# Patient Record
Sex: FEMALE | Race: WHITE | NOT HISPANIC OR LATINO
[De-identification: names, ages, dates, MRNs, and addresses within clinical notes are randomized per-mention and may not be internally consistent; named-entity substitution may affect disease eponyms.]

---

## 2020-10-27 ENCOUNTER — APPOINTMENT (OUTPATIENT)
Dept: ENDOCRINOLOGY | Facility: CLINIC | Age: 75
End: 2020-10-27
Payer: MEDICARE

## 2020-10-27 VITALS
SYSTOLIC BLOOD PRESSURE: 130 MMHG | HEART RATE: 91 BPM | WEIGHT: 218 LBS | DIASTOLIC BLOOD PRESSURE: 81 MMHG | HEIGHT: 62.5 IN | BODY MASS INDEX: 39.11 KG/M2

## 2020-10-27 DIAGNOSIS — Z86.39 PERSONAL HISTORY OF OTHER ENDOCRINE, NUTRITIONAL AND METABOLIC DISEASE: ICD-10-CM

## 2020-10-27 DIAGNOSIS — Z78.9 OTHER SPECIFIED HEALTH STATUS: ICD-10-CM

## 2020-10-27 DIAGNOSIS — Z63.4 DISAPPEARANCE AND DEATH OF FAMILY MEMBER: ICD-10-CM

## 2020-10-27 DIAGNOSIS — M79.606 PAIN IN LEG, UNSPECIFIED: ICD-10-CM

## 2020-10-27 LAB
GLUCOSE BLDC GLUCOMTR-MCNC: 154
HBA1C MFR BLD HPLC: 7.8

## 2020-10-27 PROCEDURE — 83036 HEMOGLOBIN GLYCOSYLATED A1C: CPT | Mod: QW

## 2020-10-27 PROCEDURE — 99072 ADDL SUPL MATRL&STAF TM PHE: CPT

## 2020-10-27 PROCEDURE — 82962 GLUCOSE BLOOD TEST: CPT

## 2020-10-27 PROCEDURE — 99204 OFFICE O/P NEW MOD 45 MIN: CPT | Mod: 25

## 2020-10-27 SDOH — SOCIAL STABILITY - SOCIAL INSECURITY: DISSAPEARANCE AND DEATH OF FAMILY MEMBER: Z63.4

## 2020-10-27 NOTE — HISTORY OF PRESENT ILLNESS
[FreeTextEntry1] : 74 y.o. female with a h/o type 2 Dm for over 10 yrs.\par The patient is currently on metformin 850 mg TID and Lantus 36 u BID.\par She has gained about 40 lb during the last several months.\par She was treated with Trulicity in the past but did not tolerate this medications - GI side effects.\par She had a recent eye exam, reportedly without retinopathy.\par She c/o tingling and numbness in her feet.\par She reports no hypoglycemia.\par She recently lost her  to [employer].\par She has home care aid several days a week.\par HbA1C today is 7.8%, glucose - 154 mg/dl.\par

## 2020-10-27 NOTE — REASON FOR VISIT
[Initial Evaluation] : an initial evaluation [DM Type 2] : DM Type 2 [Hypothyroidism] : hypothyroidism [Weight Management/Obesity] : weight management/obesity

## 2020-10-27 NOTE — ASSESSMENT
[FreeTextEntry1] : DM, type 2, subadequate glycemic control; diabetic neuropathy.\par Obesity.\par Hypothyroidism.\par Lab. tests today.\par To see nutritionist.\par Appointment with the .\par Consider using Jardiance - discussed in detail.\par Consider reducing the dose of metformin to 1000 mg BID and Lantus to 40 u HS.\par F/u - 2 weeks.\par

## 2020-10-28 ENCOUNTER — APPOINTMENT (OUTPATIENT)
Dept: ENDOCRINOLOGY | Facility: CLINIC | Age: 75
End: 2020-10-28
Payer: MEDICARE

## 2020-10-28 PROCEDURE — 99072 ADDL SUPL MATRL&STAF TM PHE: CPT

## 2020-10-28 PROCEDURE — 97802 MEDICAL NUTRITION INDIV IN: CPT

## 2020-11-03 ENCOUNTER — APPOINTMENT (OUTPATIENT)
Dept: ENDOCRINOLOGY | Facility: CLINIC | Age: 75
End: 2020-11-03

## 2020-11-23 ENCOUNTER — APPOINTMENT (OUTPATIENT)
Dept: ENDOCRINOLOGY | Facility: CLINIC | Age: 75
End: 2020-11-23
Payer: MEDICARE

## 2020-11-23 VITALS — WEIGHT: 214 LBS | BODY MASS INDEX: 38.52 KG/M2

## 2020-11-23 PROCEDURE — 97803 MED NUTRITION INDIV SUBSEQ: CPT

## 2021-01-19 ENCOUNTER — APPOINTMENT (OUTPATIENT)
Dept: ENDOCRINOLOGY | Facility: CLINIC | Age: 76
End: 2021-01-19
Payer: MEDICARE

## 2021-01-19 VITALS
HEART RATE: 79 BPM | WEIGHT: 206 LBS | HEIGHT: 62.5 IN | BODY MASS INDEX: 36.96 KG/M2 | DIASTOLIC BLOOD PRESSURE: 70 MMHG | SYSTOLIC BLOOD PRESSURE: 123 MMHG

## 2021-01-19 VITALS — WEIGHT: 206 LBS | BODY MASS INDEX: 37.08 KG/M2

## 2021-01-19 DIAGNOSIS — E11.3553 TYPE 2 DIABETES MELLITUS WITH STABLE PROLIFERATIVE DIABETIC RETINOPATHY, BILATERAL: ICD-10-CM

## 2021-01-19 DIAGNOSIS — F32.9 MAJOR DEPRESSIVE DISORDER, SINGLE EPISODE, UNSPECIFIED: ICD-10-CM

## 2021-01-19 DIAGNOSIS — E03.9 HYPOTHYROIDISM, UNSPECIFIED: ICD-10-CM

## 2021-01-19 DIAGNOSIS — E66.9 OBESITY, UNSPECIFIED: ICD-10-CM

## 2021-01-19 LAB
GLUCOSE BLDC GLUCOMTR-MCNC: 146
HBA1C MFR BLD HPLC: 7.5

## 2021-01-19 PROCEDURE — 99072 ADDL SUPL MATRL&STAF TM PHE: CPT

## 2021-01-19 PROCEDURE — 99214 OFFICE O/P EST MOD 30 MIN: CPT | Mod: 25

## 2021-01-19 PROCEDURE — 82962 GLUCOSE BLOOD TEST: CPT

## 2021-01-19 PROCEDURE — 97803 MED NUTRITION INDIV SUBSEQ: CPT

## 2021-01-19 PROCEDURE — 83036 HEMOGLOBIN GLYCOSYLATED A1C: CPT | Mod: QW

## 2021-01-20 LAB
ALBUMIN SERPL ELPH-MCNC: 4.5 G/DL
ALP BLD-CCNC: 74 U/L
ALT SERPL-CCNC: 14 U/L
ANION GAP SERPL CALC-SCNC: 16 MMOL/L
AST SERPL-CCNC: 15 U/L
BASOPHILS # BLD AUTO: 0.02 K/UL
BASOPHILS NFR BLD AUTO: 0.4 %
BILIRUB SERPL-MCNC: 0.4 MG/DL
BUN SERPL-MCNC: 13 MG/DL
CALCIUM SERPL-MCNC: 9.7 MG/DL
CHLORIDE SERPL-SCNC: 105 MMOL/L
CHOLEST SERPL-MCNC: 194 MG/DL
CO2 SERPL-SCNC: 24 MMOL/L
CREAT SERPL-MCNC: 0.71 MG/DL
EOSINOPHIL # BLD AUTO: 0.03 K/UL
EOSINOPHIL NFR BLD AUTO: 0.7 %
GLUCOSE SERPL-MCNC: 142 MG/DL
HCT VFR BLD CALC: 41.6 %
HDLC SERPL-MCNC: 41 MG/DL
HGB BLD-MCNC: 12.7 G/DL
IMM GRANULOCYTES NFR BLD AUTO: 0 %
LDLC SERPL CALC-MCNC: 118 MG/DL
LYMPHOCYTES # BLD AUTO: 2.62 K/UL
LYMPHOCYTES NFR BLD AUTO: 57.8 %
MAN DIFF?: NORMAL
MCHC RBC-ENTMCNC: 30.1 PG
MCHC RBC-ENTMCNC: 30.5 GM/DL
MCV RBC AUTO: 98.6 FL
MONOCYTES # BLD AUTO: 0.29 K/UL
MONOCYTES NFR BLD AUTO: 6.4 %
NEUTROPHILS # BLD AUTO: 1.57 K/UL
NEUTROPHILS NFR BLD AUTO: 34.7 %
NONHDLC SERPL-MCNC: 153 MG/DL
PLATELET # BLD AUTO: 269 K/UL
POTASSIUM SERPL-SCNC: 4.3 MMOL/L
PROT SERPL-MCNC: 6.9 G/DL
RBC # BLD: 4.22 M/UL
RBC # FLD: 13.4 %
SODIUM SERPL-SCNC: 145 MMOL/L
T3FREE SERPL-MCNC: 2.33 PG/ML
T4 FREE SERPL-MCNC: 1.4 NG/DL
TRIGL SERPL-MCNC: 174 MG/DL
TSH SERPL-ACNC: 1.52 UIU/ML
WBC # FLD AUTO: 4.53 K/UL

## 2021-01-20 NOTE — ASSESSMENT
[FreeTextEntry1] : DM, type 2, adequate glycemic control - would continue current tx.\par hypothyroidism.\par lab. tests today.\par Medications refilled.\par F/u - 3 months.

## 2021-01-20 NOTE — HISTORY OF PRESENT ILLNESS
[FreeTextEntry1] : 74 y.o. female with a h/o type 2 Dm for over 10 yrs.\par The patient is currently on metformin 850 mg TID and Lantus 36 u BID.\par She has gained about 40 lb during the last several months.\par She was treated with Trulicity in the past but did not tolerate this medications - GI side effects.\par She had a recent eye exam, reportedly without retinopathy.\par She c/o tingling and numbness in her feet.\par She reports no hypoglycemia.\par She recently lost her  to COVID19.\par She has home care aid several days a week.\par HbA1C today is 7.8%, glucose - 154 mg/dl.\par 1/19/2021. The patient is doing well.\par She has lost 12 lb.\par Tolerating Jardiance without difficulty.\par HbA1C today is 7.5%, glucose - 146 mg/dl.\par She continues on T4 tx.\par

## 2021-01-20 NOTE — PHYSICAL EXAM
[Alert] : alert [Well Nourished] : well nourished [No Acute Distress] : no acute distress [Well Developed] : well developed [Normal Sclera/Conjunctiva] : normal sclera/conjunctiva [EOMI] : extra ocular movement intact [No Proptosis] : no proptosis [Normal Oropharynx] : the oropharynx was normal [Thyroid Not Enlarged] : the thyroid was not enlarged [No Thyroid Nodules] : no palpable thyroid nodules [No Respiratory Distress] : no respiratory distress [No Accessory Muscle Use] : no accessory muscle use [Clear to Auscultation] : lungs were clear to auscultation bilaterally [Normal S1, S2] : normal S1 and S2 [Normal Rate] : heart rate was normal [Regular Rhythm] : with a regular rhythm [No Edema] : no peripheral edema [Pedal Pulses Normal] : the pedal pulses are present [Normal Bowel Sounds] : normal bowel sounds [Not Tender] : non-tender [Not Distended] : not distended [Soft] : abdomen soft [Normal Anterior Cervical Nodes] : no anterior cervical lymphadenopathy [Normal Posterior Cervical Nodes] : no posterior cervical lymphadenopathy [No Spinal Tenderness] : no spinal tenderness [Spine Straight] : spine straight [Normal Gait] : normal gait [No Stigmata of Cushings Syndrome] : no stigmata of Cushings Syndrome [Normal Strength/Tone] : muscle strength and tone were normal [No Rash] : no rash [Normal Reflexes] : deep tendon reflexes were 2+ and symmetric [No Tremors] : no tremors [Oriented x3] : oriented to person, place, and time [Acanthosis Nigricans] : no acanthosis nigricans

## 2021-01-22 ENCOUNTER — APPOINTMENT (OUTPATIENT)
Dept: ENDOCRINOLOGY | Facility: CLINIC | Age: 76
End: 2021-01-22

## 2021-01-25 ENCOUNTER — APPOINTMENT (OUTPATIENT)
Dept: ENDOCRINOLOGY | Facility: CLINIC | Age: 76
End: 2021-01-25

## 2021-02-01 ENCOUNTER — APPOINTMENT (OUTPATIENT)
Dept: ENDOCRINOLOGY | Facility: CLINIC | Age: 76
End: 2021-02-01

## 2021-02-08 ENCOUNTER — APPOINTMENT (OUTPATIENT)
Dept: ENDOCRINOLOGY | Facility: CLINIC | Age: 76
End: 2021-02-08

## 2021-02-17 ENCOUNTER — APPOINTMENT (OUTPATIENT)
Dept: ENDOCRINOLOGY | Facility: CLINIC | Age: 76
End: 2021-02-17
Payer: MEDICARE

## 2021-02-17 PROCEDURE — 98968 PH1 ASSMT&MGMT NQHP 21-30: CPT

## 2024-01-23 ENCOUNTER — INPATIENT (INPATIENT)
Facility: HOSPITAL | Age: 79
LOS: 0 days | Discharge: HOME | DRG: 177 | End: 2024-01-24
Attending: STUDENT IN AN ORGANIZED HEALTH CARE EDUCATION/TRAINING PROGRAM | Admitting: STUDENT IN AN ORGANIZED HEALTH CARE EDUCATION/TRAINING PROGRAM
Payer: COMMERCIAL

## 2024-01-23 VITALS
RESPIRATION RATE: 16 BRPM | SYSTOLIC BLOOD PRESSURE: 115 MMHG | HEART RATE: 92 BPM | DIASTOLIC BLOOD PRESSURE: 56 MMHG | TEMPERATURE: 101 F | OXYGEN SATURATION: 92 %

## 2024-01-23 DIAGNOSIS — K57.92 DIVERTICULITIS OF INTESTINE, PART UNSPECIFIED, WITHOUT PERFORATION OR ABSCESS WITHOUT BLEEDING: ICD-10-CM

## 2024-01-23 DIAGNOSIS — F32.9 MAJOR DEPRESSIVE DISORDER, SINGLE EPISODE, UNSPECIFIED: ICD-10-CM

## 2024-01-23 DIAGNOSIS — K21.9 GASTRO-ESOPHAGEAL REFLUX DISEASE WITHOUT ESOPHAGITIS: ICD-10-CM

## 2024-01-23 DIAGNOSIS — K59.00 CONSTIPATION, UNSPECIFIED: ICD-10-CM

## 2024-01-23 DIAGNOSIS — J96.01 ACUTE RESPIRATORY FAILURE WITH HYPOXIA: ICD-10-CM

## 2024-01-23 DIAGNOSIS — E11.9 TYPE 2 DIABETES MELLITUS WITHOUT COMPLICATIONS: ICD-10-CM

## 2024-01-23 DIAGNOSIS — E78.5 HYPERLIPIDEMIA, UNSPECIFIED: ICD-10-CM

## 2024-01-23 DIAGNOSIS — G47.00 INSOMNIA, UNSPECIFIED: ICD-10-CM

## 2024-01-23 DIAGNOSIS — E03.9 HYPOTHYROIDISM, UNSPECIFIED: ICD-10-CM

## 2024-01-23 DIAGNOSIS — Z29.9 ENCOUNTER FOR PROPHYLACTIC MEASURES, UNSPECIFIED: ICD-10-CM

## 2024-01-23 LAB
ALBUMIN SERPL ELPH-MCNC: 3.6 G/DL — SIGNIFICANT CHANGE UP (ref 3.3–5)
ALP SERPL-CCNC: 87 U/L — SIGNIFICANT CHANGE UP (ref 40–120)
ALT FLD-CCNC: 14 U/L — SIGNIFICANT CHANGE UP (ref 10–45)
ANION GAP SERPL CALC-SCNC: 10 MMOL/L — SIGNIFICANT CHANGE UP (ref 5–17)
AST SERPL-CCNC: 21 U/L — SIGNIFICANT CHANGE UP (ref 10–40)
BASOPHILS # BLD AUTO: 0.02 K/UL — SIGNIFICANT CHANGE UP (ref 0–0.2)
BASOPHILS NFR BLD AUTO: 0.5 % — SIGNIFICANT CHANGE UP (ref 0–2)
BILIRUB SERPL-MCNC: 0.7 MG/DL — SIGNIFICANT CHANGE UP (ref 0.2–1.2)
BUN SERPL-MCNC: 9 MG/DL — SIGNIFICANT CHANGE UP (ref 7–23)
CALCIUM SERPL-MCNC: 8.9 MG/DL — SIGNIFICANT CHANGE UP (ref 8.4–10.5)
CHLORIDE SERPL-SCNC: 107 MMOL/L — SIGNIFICANT CHANGE UP (ref 96–108)
CO2 SERPL-SCNC: 25 MMOL/L — SIGNIFICANT CHANGE UP (ref 22–31)
CREAT SERPL-MCNC: 0.73 MG/DL — SIGNIFICANT CHANGE UP (ref 0.5–1.3)
EGFR: 84 ML/MIN/1.73M2 — SIGNIFICANT CHANGE UP
EOSINOPHIL # BLD AUTO: 0.02 K/UL — SIGNIFICANT CHANGE UP (ref 0–0.5)
EOSINOPHIL NFR BLD AUTO: 0.5 % — SIGNIFICANT CHANGE UP (ref 0–6)
GLUCOSE BLDC GLUCOMTR-MCNC: 191 MG/DL — HIGH (ref 70–99)
GLUCOSE SERPL-MCNC: 151 MG/DL — HIGH (ref 70–99)
HCT VFR BLD CALC: 38.1 % — SIGNIFICANT CHANGE UP (ref 34.5–45)
HGB BLD-MCNC: 12.3 G/DL — SIGNIFICANT CHANGE UP (ref 11.5–15.5)
IMM GRANULOCYTES NFR BLD AUTO: 0.3 % — SIGNIFICANT CHANGE UP (ref 0–0.9)
LYMPHOCYTES # BLD AUTO: 1.75 K/UL — SIGNIFICANT CHANGE UP (ref 1–3.3)
LYMPHOCYTES # BLD AUTO: 44.3 % — HIGH (ref 13–44)
MCHC RBC-ENTMCNC: 29.8 PG — SIGNIFICANT CHANGE UP (ref 27–34)
MCHC RBC-ENTMCNC: 32.3 GM/DL — SIGNIFICANT CHANGE UP (ref 32–36)
MCV RBC AUTO: 92.3 FL — SIGNIFICANT CHANGE UP (ref 80–100)
MONOCYTES # BLD AUTO: 0.51 K/UL — SIGNIFICANT CHANGE UP (ref 0–0.9)
MONOCYTES NFR BLD AUTO: 12.9 % — SIGNIFICANT CHANGE UP (ref 2–14)
NEUTROPHILS # BLD AUTO: 1.64 K/UL — LOW (ref 1.8–7.4)
NEUTROPHILS NFR BLD AUTO: 41.5 % — LOW (ref 43–77)
NRBC # BLD: 0 /100 WBCS — SIGNIFICANT CHANGE UP (ref 0–0)
PLATELET # BLD AUTO: 188 K/UL — SIGNIFICANT CHANGE UP (ref 150–400)
POTASSIUM SERPL-MCNC: 3.8 MMOL/L — SIGNIFICANT CHANGE UP (ref 3.5–5.3)
POTASSIUM SERPL-SCNC: 3.8 MMOL/L — SIGNIFICANT CHANGE UP (ref 3.5–5.3)
PROT SERPL-MCNC: 6.4 G/DL — SIGNIFICANT CHANGE UP (ref 6–8.3)
RBC # BLD: 4.13 M/UL — SIGNIFICANT CHANGE UP (ref 3.8–5.2)
RBC # FLD: 14.2 % — SIGNIFICANT CHANGE UP (ref 10.3–14.5)
SARS-COV-2 RNA SPEC QL NAA+PROBE: POSITIVE
SODIUM SERPL-SCNC: 142 MMOL/L — SIGNIFICANT CHANGE UP (ref 135–145)
WBC # BLD: 3.95 K/UL — SIGNIFICANT CHANGE UP (ref 3.8–10.5)
WBC # FLD AUTO: 3.95 K/UL — SIGNIFICANT CHANGE UP (ref 3.8–10.5)

## 2024-01-23 PROCEDURE — 99285 EMERGENCY DEPT VISIT HI MDM: CPT | Mod: FS

## 2024-01-23 PROCEDURE — 71046 X-RAY EXAM CHEST 2 VIEWS: CPT | Mod: 26

## 2024-01-23 RX ORDER — LEMBOREXANT 10 MG/1
1 TABLET, FILM COATED ORAL
Refills: 0 | DISCHARGE

## 2024-01-23 RX ORDER — LEVOTHYROXINE SODIUM 125 MCG
112 TABLET ORAL DAILY
Refills: 0 | Status: DISCONTINUED | OUTPATIENT
Start: 2024-01-23 | End: 2024-01-24

## 2024-01-23 RX ORDER — DARIDOREXANT 25 MG/1
1 TABLET, FILM COATED ORAL
Refills: 0 | DISCHARGE

## 2024-01-23 RX ORDER — FAMOTIDINE 10 MG/ML
20 INJECTION INTRAVENOUS DAILY
Refills: 0 | Status: DISCONTINUED | OUTPATIENT
Start: 2024-01-23 | End: 2024-01-24

## 2024-01-23 RX ORDER — ATORVASTATIN CALCIUM 80 MG/1
40 TABLET, FILM COATED ORAL AT BEDTIME
Refills: 0 | Status: DISCONTINUED | OUTPATIENT
Start: 2024-01-23 | End: 2024-01-24

## 2024-01-23 RX ORDER — METFORMIN HYDROCHLORIDE 850 MG/1
1 TABLET ORAL
Refills: 0 | DISCHARGE

## 2024-01-23 RX ORDER — ACETAMINOPHEN 500 MG
975 TABLET ORAL ONCE
Refills: 0 | Status: COMPLETED | OUTPATIENT
Start: 2024-01-23 | End: 2024-01-23

## 2024-01-23 RX ORDER — ROSUVASTATIN CALCIUM 5 MG/1
1 TABLET ORAL
Refills: 0 | DISCHARGE

## 2024-01-23 RX ORDER — TRAZODONE HCL 50 MG
50 TABLET ORAL AT BEDTIME
Refills: 0 | Status: DISCONTINUED | OUTPATIENT
Start: 2024-01-23 | End: 2024-01-24

## 2024-01-23 RX ORDER — INSULIN GLARGINE 100 [IU]/ML
16 INJECTION, SOLUTION SUBCUTANEOUS EVERY MORNING
Refills: 0 | Status: DISCONTINUED | OUTPATIENT
Start: 2024-01-24 | End: 2024-01-24

## 2024-01-23 RX ORDER — ENOXAPARIN SODIUM 100 MG/ML
40 INJECTION SUBCUTANEOUS EVERY 24 HOURS
Refills: 0 | Status: DISCONTINUED | OUTPATIENT
Start: 2024-01-23 | End: 2024-01-24

## 2024-01-23 RX ORDER — PANTOPRAZOLE SODIUM 20 MG/1
40 TABLET, DELAYED RELEASE ORAL
Refills: 0 | Status: DISCONTINUED | OUTPATIENT
Start: 2024-01-23 | End: 2024-01-24

## 2024-01-23 RX ORDER — POLYETHYLENE GLYCOL 3350 17 G/17G
17 POWDER, FOR SOLUTION ORAL EVERY 12 HOURS
Refills: 0 | Status: DISCONTINUED | OUTPATIENT
Start: 2024-01-23 | End: 2024-01-24

## 2024-01-23 RX ORDER — ONDANSETRON 8 MG/1
4 TABLET, FILM COATED ORAL EVERY 8 HOURS
Refills: 0 | Status: DISCONTINUED | OUTPATIENT
Start: 2024-01-23 | End: 2024-01-23

## 2024-01-23 RX ORDER — BREXPIPRAZOLE 0.25 MG/1
1 TABLET ORAL
Refills: 0 | DISCHARGE

## 2024-01-23 RX ORDER — FAMOTIDINE 10 MG/ML
1 INJECTION INTRAVENOUS
Refills: 0 | DISCHARGE

## 2024-01-23 RX ORDER — EMPAGLIFLOZIN 10 MG/1
1 TABLET, FILM COATED ORAL
Refills: 0 | DISCHARGE

## 2024-01-23 RX ORDER — ACETAMINOPHEN 500 MG
650 TABLET ORAL EVERY 6 HOURS
Refills: 0 | Status: DISCONTINUED | OUTPATIENT
Start: 2024-01-23 | End: 2024-01-24

## 2024-01-23 RX ORDER — REMDESIVIR 5 MG/ML
200 INJECTION INTRAVENOUS EVERY 24 HOURS
Refills: 0 | Status: COMPLETED | OUTPATIENT
Start: 2024-01-23 | End: 2024-01-23

## 2024-01-23 RX ORDER — LAMOTRIGINE 25 MG/1
0 TABLET, ORALLY DISINTEGRATING ORAL
Refills: 0 | DISCHARGE

## 2024-01-23 RX ORDER — REMDESIVIR 5 MG/ML
100 INJECTION INTRAVENOUS EVERY 24 HOURS
Refills: 0 | Status: DISCONTINUED | OUTPATIENT
Start: 2024-01-24 | End: 2024-01-24

## 2024-01-23 RX ORDER — LANOLIN ALCOHOL/MO/W.PET/CERES
3 CREAM (GRAM) TOPICAL AT BEDTIME
Refills: 0 | Status: DISCONTINUED | OUTPATIENT
Start: 2024-01-23 | End: 2024-01-23

## 2024-01-23 RX ORDER — CLONAZEPAM 1 MG
0 TABLET ORAL
Refills: 0 | DISCHARGE

## 2024-01-23 RX ORDER — SENNA PLUS 8.6 MG/1
2 TABLET ORAL AT BEDTIME
Refills: 0 | Status: DISCONTINUED | OUTPATIENT
Start: 2024-01-23 | End: 2024-01-24

## 2024-01-23 RX ORDER — REMDESIVIR 5 MG/ML
INJECTION INTRAVENOUS
Refills: 0 | Status: DISCONTINUED | OUTPATIENT
Start: 2024-01-23 | End: 2024-01-24

## 2024-01-23 RX ORDER — INSULIN LISPRO 100/ML
VIAL (ML) SUBCUTANEOUS
Refills: 0 | Status: DISCONTINUED | OUTPATIENT
Start: 2024-01-23 | End: 2024-01-24

## 2024-01-23 RX ORDER — DEXAMETHASONE 0.5 MG/5ML
6 ELIXIR ORAL ONCE
Refills: 0 | Status: COMPLETED | OUTPATIENT
Start: 2024-01-23 | End: 2024-01-23

## 2024-01-23 RX ORDER — LEVOTHYROXINE SODIUM 125 MCG
1 TABLET ORAL
Refills: 0 | DISCHARGE

## 2024-01-23 RX ORDER — DEXAMETHASONE 0.5 MG/5ML
6 ELIXIR ORAL DAILY
Refills: 0 | Status: DISCONTINUED | OUTPATIENT
Start: 2024-01-24 | End: 2024-01-24

## 2024-01-23 RX ADMIN — REMDESIVIR 200 MILLIGRAM(S): 5 INJECTION INTRAVENOUS at 23:36

## 2024-01-23 RX ADMIN — Medication 6 MILLIGRAM(S): at 18:56

## 2024-01-23 RX ADMIN — Medication 50 MILLIGRAM(S): at 22:28

## 2024-01-23 RX ADMIN — Medication 2: at 22:29

## 2024-01-23 RX ADMIN — ATORVASTATIN CALCIUM 40 MILLIGRAM(S): 80 TABLET, FILM COATED ORAL at 22:28

## 2024-01-23 RX ADMIN — ENOXAPARIN SODIUM 40 MILLIGRAM(S): 100 INJECTION SUBCUTANEOUS at 23:26

## 2024-01-23 RX ADMIN — Medication 975 MILLIGRAM(S): at 17:39

## 2024-01-23 RX ADMIN — SENNA PLUS 2 TABLET(S): 8.6 TABLET ORAL at 22:28

## 2024-01-23 NOTE — H&P ADULT - PROBLEM SELECTOR PLAN 2
patient states she has not had a BM in 2-3 days  Denies any abdominal pain or discomfort  abdomen soft NTND, BS present in all 4 quadrants on PE.    Plan:  Start bowel regimen- senna and Miralax  monitor BM  if worsens clinically, consider imaging

## 2024-01-23 NOTE — ED PROVIDER NOTE - OBJECTIVE STATEMENT
77 y/o female w/ hx diverticulitis, natalio, dm, hypothyroidism, gerd p/w covid infx.  States received pneumococcal vaccine 6 days ago, then next day or so began feeling sick with congestion and mildly productive cough.  Saw her PMD, who dx pt with covid 19, advised pt to go to ED.  +fever.  Denies cp, sob, abd pain, n/v/d.  Denies hx smoking or lung problems.

## 2024-01-23 NOTE — H&P ADULT - NSHPSOCIALHISTORY_GEN_ALL_CORE
Lives alone.  passed away 4 years ago.  Active can walk 10 blocks without assistance. Some difficulty with stairs avoids taking them.   Has HHA from 7 am- 1pm daily  Works in fundraising and helping people.  Denies tobacco, alcohol, and recreational drug use.

## 2024-01-23 NOTE — H&P ADULT - PROBLEM SELECTOR PLAN 5
Home med: synthroid 112mcg at home  Plan:   Continue synthroid 112 mcg   Follow up TSH/ T4 home meds based on surescripts: Trazadone 50mg daily, DAYVIGO 5mg daily, quviviq 25  Please continue after official med rec in AM

## 2024-01-23 NOTE — ED ADULT NURSE REASSESSMENT NOTE - NS ED NURSE REASSESS COMMENT FT1
Assumed care of pt, awaiting transfer to IP bed. Denies any complaints at this time, verbalizing readiness to be transported upstairs.

## 2024-01-23 NOTE — ED PROVIDER NOTE - CLINICAL SUMMARY MEDICAL DECISION MAKING FREE TEXT BOX
79 y/o female w/ hx diverticulitis, natalio, dm, hypothyroidism, gerd p/w covid infx.  States received pneumococcal vaccine 6 days ago, then next day or so began feeling sick with congestion and mildly productive cough.  Saw her PMD, who dx pt with covid 19, advised pt to go to ED.  +fever.  Denies cp, sob, abd pain, n/v/d.  Denies hx smoking or lung problems.  VS with T 100.7, O2 sat 92%RA, 90% at bedside   Exam grossly unrevealing  CXR neg  Will admit for covid hypoxia

## 2024-01-23 NOTE — H&P ADULT - HISTORY OF PRESENT ILLNESS
PCP: Dr. Anne  Pharmacy: [facility]   - - - - -    HPI: 79 y/o female w/ hx diverticulitis, natalio, dm, hypothyroidism, gerd p/w covid infx.  States received pneumococcal vaccine 6 days ago, then next day or so began feeling sick with congestion and mildly productive cough.  Saw her PMD, who dx pt with covid 19, advised pt to go to ED.  +fever.  Denies cp, sob, abd pain, n/v/d.  Denies hx smoking or lung problems.    In the ED:  Initial vital signs: T: 100.8 F, HR: 92 BP: 115/56 R:16 SpO2: 92% on RA  ED course:   Labs: significant for no leukocytosis, H/H 12.3/ 38.1, CMP unremarkable, COVID positive   Imaging:  CXR: No acute pulmonary, osseous or soft tissue pathology.  EKG: NSR , left anterior fascicular block  Medications: PO Tylenol 975mg X1, IV dexamethasone 6mg x1,   Consults: none        PCP: Dr. Anne  Pharmacy: [facility]   - - - - -    HPI: 77 y/o female with diverticulitis, natalio, dm, hypothyroidism, gerd presents with sob and cough since  last Wednesday. States she initially had a cough productive of yellow mucous, but today her cough is more dry. States received pneumococcal vaccine 6 days ago, then next day or so began feeling sick with congestion and mildly productive cough.  Patient saw her PCP today who told her she was COVID+. She states she is intermittently febrile. She is constipated, her last BM was 2-3 days ago. She normally has BM daily. Denies cp, sob, abd pain, n/v/d.  Patient states she lives alone. No recent travel. No known sick contacts. Denies hx smoking or lung problems. Patient states she feels a lot better since being in the ED. No additional fevers in the ED. Cough and congestion improved in the ED.    In the ED:  Initial vital signs: T: 100.8 F, HR: 92 BP: 115/56 R:16 SpO2: 92% on RA-> 95 on 5LNC   ED course:   Labs: significant for no leukocytosis, H/H 12.3/ 38.1, CMP unremarkable, COVID positive   Imaging:  CXR: No acute pulmonary, osseous or soft tissue pathology.  EKG: NSR , left anterior fascicular block  Medications: PO Tylenol 975mg X1, IV dexamethasone 6mg x1,   Consults: none        PCP: Dr. Anne  Pharmacy: [facility]   - - - - -    HPI: 77 y/o female with diverticulitis, natalio, dm, hypothyroidism, gerd presents with sob and cough since  last Wednesday. States she initially had a cough productive of yellow mucous, but today her cough is more dry. States received pneumococcal vaccine 6 days ago, then next day or so began feeling sick with congestion and mildly productive cough.  Patient saw her PCP today who told her she was COVID+. She states she is intermittently febrile. She is constipated, her last BM was 2-3 days ago. She normally has BM daily. Denies cp, sob, abd pain, n/v/d.  Patient states she lives alone. No recent travel. No known sick contacts. Denies hx smoking or lung problems. Patient states she feels a lot better since being in the ED. No additional fevers in the ED. Cough and congestion improved in the ED. Patient states she is tired and would like to talk in the AM. She is unsure of her home meds, but states she took them this AM.     In the ED:  Initial vital signs: T: 100.8 F, HR: 92 BP: 115/56 R:16 SpO2: 92% on RA-> 95 on 5LNC   ED course:   Labs: significant for no leukocytosis, H/H 12.3/ 38.1, CMP unremarkable, COVID positive   Imaging:  CXR: No acute pulmonary, osseous or soft tissue pathology.  EKG: NSR , left anterior fascicular block  Medications: PO Tylenol 975mg X1, IV dexamethasone 6mg x1,   Consults: none        PCP: Dr. Anne  Pharmacy: [facility]   - - - - -    HPI: 79 y/o female with diverticulitis, natalio, dm, hypothyroidism, gerd presents with sob and cough  that started  last Wednesday. States she initially had a cough productive of yellow mucous, but today her cough is more dry. States received pneumococcal vaccine 6 days ago, then next day or so began feeling sick with congestion and mildly productive cough.  Patient saw her PCP today who told her she was COVID+. She states she is intermittently febrile at home. She is constipated, her last BM was 2-3 days ago. She normally has BM daily. Denies cp, palpitations, abd pain, n/v/d.  Patient states she lives alone. No recent travel. No known sick contacts. Denies hx smoking or lung problems. Patient states she feels a lot better since being in the ED. No additional fevers in the ED. Cough and congestion improved in the ED. Patient states she is tired and would like to talk in the AM. She is unsure of her home meds, but states she took them this AM.     In the ED:  Initial vital signs: T: 100.8 F, HR: 92 BP: 115/56 R:16 SpO2: 92% on RA-> 95 on 5LNC   ED course:   Labs: significant for no leukocytosis, H/H 12.3/ 38.1, CMP unremarkable, COVID positive   Imaging:  CXR: No acute pulmonary, osseous or soft tissue pathology.  EKG: NSR , left anterior fascicular block  Medications: PO Tylenol 975mg X1, IV dexamethasone 6mg x1,   Consults: none

## 2024-01-23 NOTE — H&P ADULT - NSHPPHYSICALEXAM_GEN_ALL_CORE
.  VITAL SIGNS:  T(C): 37.2 (01-23-24 @ 19:38), Max: 38.2 (01-23-24 @ 16:36)  T(F): 99 (01-23-24 @ 19:38), Max: 100.8 (01-23-24 @ 16:36)  HR: 92 (01-23-24 @ 19:38) (92 - 92)  BP: 126/78 (01-23-24 @ 19:38) (115/56 - 126/78)  BP(mean): --  RR: 18 (01-23-24 @ 19:38) (16 - 18)  SpO2: 95% (01-23-24 @ 19:38) (92% - 95%)  Wt(kg): --    PHYSICAL EXAM:    Constitutional: large body habitus, resting comfortably in bed; NAD  Eyes: PERRL, EOMI, anicteric sclera  ENT: no nasal discharge; uvula midline, no oropharyngeal erythema or exudates; dry MM  Respiratory: CTA B/L; no W/R/R, no retractions  Cardiac: +S1/S2; RRR; no M/R/G; PMI non-displaced  Gastrointestinal: abdomen soft, NT/ND; no rebound or guarding; +BSx4  Extremities: WWP, no clubbing or cyanosis; no peripheral edema  Musculoskeletal: NROM x4; no joint swelling, tenderness or erythema  Vascular: 2+ radial, DP/PT pulses B/L  Dermatologic: skin warm, dry and intact; no rashes, wounds, or scars  Neurologic: AAOx3; CNII-XII grossly intact; no focal deficits  Psychiatric: affect and characteristics of appearance, verbalizations, behaviors are appropriate

## 2024-01-23 NOTE — ED PROVIDER NOTE - NSICDXPASTMEDICALHX_GEN_ALL_CORE_FT
PAST MEDICAL HISTORY:  Diabetes     Diverticulitis     GERD (gastroesophageal reflux disease)     Hypothyroid     MARY ANN (obstructive sleep apnea)

## 2024-01-23 NOTE — H&P ADULT - PROBLEM SELECTOR PLAN 6
on home dexilant DR 60mg and famotidine 20mg     Plan:  continue home meds home med: Rosuvastatin 10mg     Plan:  Continue home med Rosuvastatin 10mg   Lipid panel Home med: synthroid 112mcg at home  Plan:   Continue synthroid 112 mcg   Follow up TSH/ T4

## 2024-01-23 NOTE — H&P ADULT - PROBLEM SELECTOR PLAN 1
presented with sob and cough for about 1 week, found to be COVID+  requiring 2L NC  sating 92% on RA at admission, improved to 95% on 2L NC    Plan:  start dexamethasone 6 mG daily for 5 days. Will start insulin sliding scale while on steroids   start remdesavir 200 mG IV x 1 dose on day 1, followed by 100 mG IV q24h for 4 days  wean oxygen as tolerated   encourage hydration  symptomatic treatment- mucinex for congestion and tessalon pearles for cough  OOBTC  ICS

## 2024-01-23 NOTE — H&P ADULT - PROBLEM SELECTOR PLAN 3
home meds: metformin 850mg BID, Jardiance 25mg daily, lantus 16 units in AM    Plan:  insuline sliding scale  continue home insulin  patient needs official med rec in AM. Patient unsure about exact medications home meds: metformin 850mg BID, Jardiance 25mg daily, lantus 16 units in AM    Plan:  insuline sliding scale  continue home insulin  expecting increased insulin requirements as patient is on Steroids  continue to monitor fsg  patient needs official med rec in AM. Patient unsure about exact medications

## 2024-01-23 NOTE — H&P ADULT - PROBLEM SELECTOR PLAN 4
home meds based on surescripts: vilazodone 40 daily, rexulti 2mg daily and vortioxetine 20daily    Plan:  patient needs official med rec in AM. Patient unsure about exact medications  Please continue after official med rec in AM    Anxiety: Klonapin 2mg as needed per surescripts  Please continue after official med rec in AM

## 2024-01-23 NOTE — H&P ADULT - NSHPLABSRESULTS_GEN_ALL_CORE
LABS:                         12.3   3.95  )-----------( 188      ( 23 Jan 2024 18:08 )             38.1     01-23    142  |  107  |  9   ----------------------------<  151<H>  3.8   |  25  |  0.73    Ca    8.9      23 Jan 2024 18:08    TPro  6.4  /  Alb  3.6  /  TBili  0.7  /  DBili  x   /  AST  21  /  ALT  14  /  AlkPhos  87  01-23      Urinalysis Basic - ( 23 Jan 2024 18:08 )    Color: x / Appearance: x / SG: x / pH: x  Gluc: 151 mg/dL / Ketone: x  / Bili: x / Urobili: x   Blood: x / Protein: x / Nitrite: x   Leuk Esterase: x / RBC: x / WBC x   Sq Epi: x / Non Sq Epi: x / Bacteria: x                RADIOLOGY, EKG & ADDITIONAL TESTS:

## 2024-01-23 NOTE — ED PROVIDER NOTE - NS ED ROS FT
CONSTITUTIONAL: Denies fever and chills    HEENT: +cough    RESPIRATORY: Denies SOB    CARDIOVASCULAR: Denies chest pain.    GASTROINTESTINAL: Denies abdominal pain, nausea, vomiting and diarrhea.

## 2024-01-23 NOTE — H&P ADULT - PROBLEM SELECTOR PLAN 9
F: none  E: keep K >4 and Mg >2, replete as necessary  N: DASH diet  DVT ppx: heparin sub q  Dispo: [facility] F: none  E: keep K >4 and Mg >2, replete as necessary  N: Carb consistent diet   DVT ppx: Lovenox, SCDs  Dispo: [facility]

## 2024-01-23 NOTE — H&P ADULT - PROBLEM SELECTOR PLAN 7
F: none  E: keep K >4 and Mg >2, replete as necessary  N: DASH diet  DVT ppx: heparin sub q  Dispo: [facility] on home dexilant DR 60mg and famotidine 20mg     Plan:  continue home meds home med: Rosuvastatin 10mg     Plan:  Continue home med Rosuvastatin 10mg   Lipid panel

## 2024-01-23 NOTE — H&P ADULT - PROBLEM SELECTOR PLAN 8
F: none  E: keep K >4 and Mg >2, replete as necessary  N: DASH diet  DVT ppx: heparin sub q  Dispo: [facility] on home dexilant DR 60mg and famotidine 20mg     Plan:  continue home meds

## 2024-01-23 NOTE — ED ADULT NURSE NOTE - OBJECTIVE STATEMENT
Patient Education        Sore Throat: Care Instructions  Your Care Instructions    Infection by bacteria or a virus causes most sore throats. Cigarette smoke, dry air, air pollution, allergies, and yelling can also cause a sore throat. Sore throats can be painful and annoying. Fortunately, most sore throats go away on their own. If you have a bacterial infection, your doctor may prescribe antibiotics. Follow-up care is a key part of your treatment and safety. Be sure to make and go to all appointments, and call your doctor if you are having problems. It's also a good idea to know your test results and keep a list of the medicines you take. How can you care for yourself at home? · If your doctor prescribed antibiotics, take them as directed. Do not stop taking them just because you feel better. You need to take the full course of antibiotics. · Gargle with warm salt water once an hour to help reduce swelling and relieve discomfort. Use 1 teaspoon of salt mixed in 1 cup of warm water. · Take an over-the-counter pain medicine, such as acetaminophen (Tylenol), ibuprofen (Advil, Motrin), or naproxen (Aleve). Read and follow all instructions on the label. · Be careful when taking over-the-counter cold or flu medicines and Tylenol at the same time. Many of these medicines have acetaminophen, which is Tylenol. Read the labels to make sure that you are not taking more than the recommended dose. Too much acetaminophen (Tylenol) can be harmful. · Drink plenty of fluids. Fluids may help soothe an irritated throat. Hot fluids, such as tea or soup, may help decrease throat pain. · Use over-the-counter throat lozenges to soothe pain. Regular cough drops or hard candy may also help. These should not be given to young children because of the risk of choking. · Do not smoke or allow others to smoke around you. If you need help quitting, talk to your doctor about stop-smoking programs and medicines.  These can increase your chances of quitting for good. · Use a vaporizer or humidifier to add moisture to your bedroom. Follow the directions for cleaning the machine. When should you call for help? Call your doctor now or seek immediate medical care if:    · You have new or worse trouble swallowing.     · Your sore throat gets much worse on one side.    Watch closely for changes in your health, and be sure to contact your doctor if you do not get better as expected. Where can you learn more? Go to http://stephani-michael.info/. Enter [id number] in the search box to learn more about \"Sore Throat: Care Instructions. \"  Current as of: October 21, 2018  Content Version: 12.2  © 2763-5887 [employer]. Care instructions adapted under license by [employer] (which disclaims liability or warranty for this information). If you have questions about a medical condition or this instruction, always ask your healthcare professional. [place] [address] any warranty or liability for your use of this information. Patient Education        Uvulitis: Care Instructions  Your Care Instructions    Uvulitis (say \"xvh-argt-IP-tus\") is an inflammation of the uvula (say \"EDV-lktu-nhl\"). This is the small piece of finger-shaped tissue that hangs down in the back of the throat. Uvulitis is most often caused by an infection. It can also be a reaction to an allergy or injury. Often the cause is not known. Your uvula may be red and swollen. You may feel like something is stuck at the back of your throat. Sometimes you may have a hard time swallowing. You may have a sore throat or a fever. Home treatment for a sore throat may be all that is needed to relieve uvulitis. If it is caused by an infection, your doctor may give you an antibiotic. Your doctor may prescribe an antihistamine or a steroid medicine if uvulitis is caused by an allergy. It may also go away without treatment.   Follow-up care is a key part of your treatment and safety. Be sure to make and go to all appointments, and call your doctor if you are having problems. It's also a good idea to know your test results and keep a list of the medicines you take. How can you care for yourself at home? · Be safe with medicines. Take your medicines exactly as prescribed. Call your doctor if you think you are having a problem with your medicine. You will get more details on the specific medicines your doctor prescribes. · Gargle with warm salt water once an hour to help reduce swelling and relieve pain. Use 1 teaspoon of salt mixed in 1 cup of warm water. · Try an over-the-counter throat spray to relieve throat pain. · Ask your doctor if you can take an over-the-counter pain medicine, such as acetaminophen (Tylenol), ibuprofen (Advil, Motrin), or naproxen (Aleve). Read and follow all instructions on the label. · Drink plenty of fluids. Fluids may help soothe your throat. If you have kidney, heart, or liver disease and have to limit fluids, talk with your doctor before you increase the amount of fluids you drink. · Do not smoke or allow others to smoke around you. Smoking can make your throat problem worse. If you need help quitting, talk to your doctor about stop-smoking programs and medicines. These can increase your chances of quitting for good. When should you call for help? Call your doctor now or seek immediate medical care if:    · You have new or worse symptoms of infection, such as:  ? Increased pain, swelling, warmth, or redness. ? Red streaks leading from the area. ? Pus draining from the area. ? A fever.     · You have new pain, or your pain gets worse.     · You have new or worse trouble swallowing.     · You seem to be getting sicker.     · You have shortness of breath.    Watch closely for changes in your health, and be sure to contact your doctor if:    · You do not get better as expected. Where can you learn more?   Go to http://stephani-michael.info/. Enter V056 in the search box to learn more about \"Uvulitis: Care Instructions. \"  Current as of: October 21, 2018  Content Version: 12.2  © 0877-9527 [employer], [facility]. Care instructions adapted under license by [employer] (which disclaims liability or warranty for this information). If you have questions about a medical condition or this instruction, always ask your healthcare professional. [place] [address] any warranty or liability for your use of this information. pt presents to ER c/o a productive cough. states she tested positive for COVID several days ago and her doctor told her to come to hospital for remdesivir. pt denies pain and sob. speaking in full sentences. respirations equal and unlabored.

## 2024-01-23 NOTE — H&P ADULT - ASSESSMENT
77 y/o female with diverticulitis, natalio, dm, hypothyroidism, gerd presents with sob and cough since last Wednesday admitted for acute hypoxic respiratory failure likely 2/2 COVID.

## 2024-01-23 NOTE — ED ADULT NURSE NOTE - NSFALLUNIVINTERV_ED_ALL_ED
Bed/Stretcher in lowest position, wheels locked, appropriate side rails in place/Call bell, personal items and telephone in reach/Instruct patient to call for assistance before getting out of bed/chair/stretcher/Non-slip footwear applied when patient is off stretcher/[place] to call system/Physically safe environment - no spills, clutter or unnecessary equipment/Purposeful proactive rounding/Room/bathroom lighting operational, light cord in reach

## 2024-01-24 ENCOUNTER — TRANSCRIPTION ENCOUNTER (OUTPATIENT)
Age: 79
End: 2024-01-24

## 2024-01-24 VITALS
TEMPERATURE: 98 F | OXYGEN SATURATION: 94 % | DIASTOLIC BLOOD PRESSURE: 78 MMHG | RESPIRATION RATE: 16 BRPM | HEART RATE: 82 BPM | SYSTOLIC BLOOD PRESSURE: 120 MMHG

## 2024-01-24 LAB
A1C WITH ESTIMATED AVERAGE GLUCOSE RESULT: 7.2 % — HIGH (ref 4–5.6)
ALBUMIN SERPL ELPH-MCNC: 3.9 G/DL — SIGNIFICANT CHANGE UP (ref 3.3–5)
ALP SERPL-CCNC: 96 U/L — SIGNIFICANT CHANGE UP (ref 40–120)
ALT FLD-CCNC: 24 U/L — SIGNIFICANT CHANGE UP (ref 10–45)
ANION GAP SERPL CALC-SCNC: 10 MMOL/L — SIGNIFICANT CHANGE UP (ref 5–17)
APTT BLD: 32.8 SEC — SIGNIFICANT CHANGE UP (ref 24.5–35.6)
AST SERPL-CCNC: 30 U/L — SIGNIFICANT CHANGE UP (ref 10–40)
BASOPHILS # BLD AUTO: 0.01 K/UL — SIGNIFICANT CHANGE UP (ref 0–0.2)
BASOPHILS NFR BLD AUTO: 0.3 % — SIGNIFICANT CHANGE UP (ref 0–2)
BILIRUB SERPL-MCNC: 0.5 MG/DL — SIGNIFICANT CHANGE UP (ref 0.2–1.2)
BUN SERPL-MCNC: 12 MG/DL — SIGNIFICANT CHANGE UP (ref 7–23)
CALCIUM SERPL-MCNC: 9.6 MG/DL — SIGNIFICANT CHANGE UP (ref 8.4–10.5)
CHLORIDE SERPL-SCNC: 107 MMOL/L — SIGNIFICANT CHANGE UP (ref 96–108)
CHOLEST SERPL-MCNC: 181 MG/DL — SIGNIFICANT CHANGE UP
CO2 SERPL-SCNC: 24 MMOL/L — SIGNIFICANT CHANGE UP (ref 22–31)
CREAT SERPL-MCNC: 0.51 MG/DL — SIGNIFICANT CHANGE UP (ref 0.5–1.3)
EGFR: 95 ML/MIN/1.73M2 — SIGNIFICANT CHANGE UP
EOSINOPHIL # BLD AUTO: 0 K/UL — SIGNIFICANT CHANGE UP (ref 0–0.5)
EOSINOPHIL NFR BLD AUTO: 0 % — SIGNIFICANT CHANGE UP (ref 0–6)
ESTIMATED AVERAGE GLUCOSE: 160 MG/DL — HIGH (ref 68–114)
GLUCOSE BLDC GLUCOMTR-MCNC: 126 MG/DL — HIGH (ref 70–99)
GLUCOSE BLDC GLUCOMTR-MCNC: 213 MG/DL — HIGH (ref 70–99)
GLUCOSE SERPL-MCNC: 203 MG/DL — HIGH (ref 70–99)
HCT VFR BLD CALC: 40.8 % — SIGNIFICANT CHANGE UP (ref 34.5–45)
HCV AB S/CO SERPL IA: 0.03 S/CO — SIGNIFICANT CHANGE UP
HCV AB SERPL-IMP: SIGNIFICANT CHANGE UP
HDLC SERPL-MCNC: 46 MG/DL — LOW
HGB BLD-MCNC: 12.8 G/DL — SIGNIFICANT CHANGE UP (ref 11.5–15.5)
IMM GRANULOCYTES NFR BLD AUTO: 0.7 % — SIGNIFICANT CHANGE UP (ref 0–0.9)
INR BLD: 0.91 — SIGNIFICANT CHANGE UP (ref 0.85–1.18)
LIPID PNL WITH DIRECT LDL SERPL: 119 MG/DL — HIGH
LYMPHOCYTES # BLD AUTO: 1.04 K/UL — SIGNIFICANT CHANGE UP (ref 1–3.3)
LYMPHOCYTES # BLD AUTO: 34.8 % — SIGNIFICANT CHANGE UP (ref 13–44)
MAGNESIUM SERPL-MCNC: 2.3 MG/DL — SIGNIFICANT CHANGE UP (ref 1.6–2.6)
MCHC RBC-ENTMCNC: 29.7 PG — SIGNIFICANT CHANGE UP (ref 27–34)
MCHC RBC-ENTMCNC: 31.4 GM/DL — LOW (ref 32–36)
MCV RBC AUTO: 94.7 FL — SIGNIFICANT CHANGE UP (ref 80–100)
MONOCYTES # BLD AUTO: 0.08 K/UL — SIGNIFICANT CHANGE UP (ref 0–0.9)
MONOCYTES NFR BLD AUTO: 2.7 % — SIGNIFICANT CHANGE UP (ref 2–14)
NEUTROPHILS # BLD AUTO: 1.84 K/UL — SIGNIFICANT CHANGE UP (ref 1.8–7.4)
NEUTROPHILS NFR BLD AUTO: 61.5 % — SIGNIFICANT CHANGE UP (ref 43–77)
NON HDL CHOLESTEROL: 135 MG/DL — HIGH
NRBC # BLD: 0 /100 WBCS — SIGNIFICANT CHANGE UP (ref 0–0)
PHOSPHATE SERPL-MCNC: 4.2 MG/DL — SIGNIFICANT CHANGE UP (ref 2.5–4.5)
PLATELET # BLD AUTO: 188 K/UL — SIGNIFICANT CHANGE UP (ref 150–400)
POTASSIUM SERPL-MCNC: 4.2 MMOL/L — SIGNIFICANT CHANGE UP (ref 3.5–5.3)
POTASSIUM SERPL-SCNC: 4.2 MMOL/L — SIGNIFICANT CHANGE UP (ref 3.5–5.3)
PROT SERPL-MCNC: 6.9 G/DL — SIGNIFICANT CHANGE UP (ref 6–8.3)
PROTHROM AB SERPL-ACNC: 10.4 SEC — SIGNIFICANT CHANGE UP (ref 9.5–13)
RBC # BLD: 4.31 M/UL — SIGNIFICANT CHANGE UP (ref 3.8–5.2)
RBC # FLD: 14.2 % — SIGNIFICANT CHANGE UP (ref 10.3–14.5)
SODIUM SERPL-SCNC: 141 MMOL/L — SIGNIFICANT CHANGE UP (ref 135–145)
TRIGL SERPL-MCNC: 82 MG/DL — SIGNIFICANT CHANGE UP
TSH SERPL-MCNC: 1.63 UIU/ML — SIGNIFICANT CHANGE UP (ref 0.27–4.2)
WBC # BLD: 2.99 K/UL — LOW (ref 3.8–10.5)
WBC # FLD AUTO: 2.99 K/UL — LOW (ref 3.8–10.5)

## 2024-01-24 PROCEDURE — 85610 PROTHROMBIN TIME: CPT

## 2024-01-24 PROCEDURE — 82962 GLUCOSE BLOOD TEST: CPT

## 2024-01-24 PROCEDURE — 80061 LIPID PANEL: CPT

## 2024-01-24 PROCEDURE — 96374 THER/PROPH/DIAG INJ IV PUSH: CPT

## 2024-01-24 PROCEDURE — 86803 HEPATITIS C AB TEST: CPT

## 2024-01-24 PROCEDURE — 36415 COLL VENOUS BLD VENIPUNCTURE: CPT

## 2024-01-24 PROCEDURE — 99285 EMERGENCY DEPT VISIT HI MDM: CPT

## 2024-01-24 PROCEDURE — 80053 COMPREHEN METABOLIC PANEL: CPT

## 2024-01-24 PROCEDURE — 84443 ASSAY THYROID STIM HORMONE: CPT

## 2024-01-24 PROCEDURE — 84100 ASSAY OF PHOSPHORUS: CPT

## 2024-01-24 PROCEDURE — 83036 HEMOGLOBIN GLYCOSYLATED A1C: CPT

## 2024-01-24 PROCEDURE — 87635 SARS-COV-2 COVID-19 AMP PRB: CPT

## 2024-01-24 PROCEDURE — 83735 ASSAY OF MAGNESIUM: CPT

## 2024-01-24 PROCEDURE — 85025 COMPLETE CBC W/AUTO DIFF WBC: CPT

## 2024-01-24 PROCEDURE — 71046 X-RAY EXAM CHEST 2 VIEWS: CPT

## 2024-01-24 PROCEDURE — 85730 THROMBOPLASTIN TIME PARTIAL: CPT

## 2024-01-24 RX ORDER — TRAZODONE HCL 50 MG
1 TABLET ORAL
Qty: 0 | Refills: 0 | DISCHARGE

## 2024-01-24 RX ORDER — ACETAMINOPHEN 500 MG
2 TABLET ORAL
Qty: 0 | Refills: 0 | DISCHARGE
Start: 2024-01-24

## 2024-01-24 RX ORDER — DEXLANSOPRAZOLE 30 MG/1
1 CAPSULE, DELAYED RELEASE ORAL
Refills: 0 | DISCHARGE

## 2024-01-24 RX ORDER — INSULIN GLARGINE 100 [IU]/ML
16 INJECTION, SOLUTION SUBCUTANEOUS
Refills: 0 | DISCHARGE

## 2024-01-24 RX ADMIN — INSULIN GLARGINE 16 UNIT(S): 100 INJECTION, SOLUTION SUBCUTANEOUS at 09:50

## 2024-01-24 RX ADMIN — POLYETHYLENE GLYCOL 3350 17 GRAM(S): 17 POWDER, FOR SOLUTION ORAL at 06:34

## 2024-01-24 RX ADMIN — Medication 112 MICROGRAM(S): at 06:33

## 2024-01-24 RX ADMIN — PANTOPRAZOLE SODIUM 40 MILLIGRAM(S): 20 TABLET, DELAYED RELEASE ORAL at 06:33

## 2024-01-24 RX ADMIN — Medication 600 MILLIGRAM(S): at 06:34

## 2024-01-24 NOTE — DISCHARGE NOTE PROVIDER - HOSPITAL COURSE
79 y/o female with diverticulitis, natalio, dm, hypothyroidism, gerd presents with sob and cough since last Wednesday admitted for acute hypoxic respiratory failure likely 2/2 COVID.       Problem/Plan - 1:  ·  Problem: Acute respiratory failure with hypoxia.   ·  Plan: presented with sob and cough for about 1 week, found to be COVID+  requiring 2L NC  sating 92% on RA at admission, improved to 95% on 2L NC    Plan:  start dexamethasone 6 mG daily for 5 days. Will start insulin sliding scale while on steroids   start remdesavir 200 mG IV x 1 dose on day 1, followed by 100 mG IV q24h for 4 days  wean oxygen as tolerated   encourage hydration  symptomatic treatment- mucinex for congestion and tessalon pearles for cough  OOBTC  ICS.     Problem/Plan - 2:  ·  Problem: Constipation.   ·  Plan: patient states she has not had a BM in 2-3 days  Denies any abdominal pain or discomfort  abdomen soft NTND, BS present in all 4 quadrants on PE.    Plan:  Start bowel regimen- senna and Miralax  monitor BM  if worsens clinically, consider imaging.     Problem/Plan - 3:  ·  Problem: Diabetes.   ·  Plan: home meds: metformin 850mg BID, Jardiance 25mg daily, lantus 16 units in AM    Plan:  insuline sliding scale  continue home insulin  expecting increased insulin requirements as patient is on Steroids  continue to monitor fsg  patient needs official med rec in AM. Patient unsure about exact medications.     Problem/Plan - 4:  ·  Problem: MDD (major depressive disorder).   ·  Plan: home meds based on surescripts: vilazodone 40 daily, rexulti 2mg daily and vortioxetine 20daily    Plan:  patient needs official med rec in AM. Patient unsure about exact medications  Please continue after official med rec in AM    Anxiety: Klonapin 2mg as needed per surescripts  Please continue after official med rec in AM.     Problem/Plan - 5:  ·  Problem: Insomnia.   ·  Plan: home meds based on surescripts: Trazadone 50mg daily, DAYVIGO 5mg daily, quviviq 25  Please continue after official med rec in AM.     Problem/Plan - 6:  ·  Problem: Hypothyroidism.   ·  Plan: Home med: synthroid 112mcg at home  Plan:   Continue synthroid 112 mcg   Follow up TSH/ T4.     Problem/Plan - 7:  ·  Problem: Hyperlipidemia.   ·  Plan: home med: Rosuvastatin 10mg     Plan:  Continue home med Rosuvastatin 10mg   Lipid panel.     Problem/Plan - 8:  ·  Problem: GERD (gastroesophageal reflux disease).   ·  Plan: on home dexilant DR 60mg and famotidine 20mg     Plan:  continue home meds.    PHYSICAL EXAM:  Constitutional: large body habitus, resting comfortably in bed; NAD  Eyes: PERRL, anicteric sclera  ENT: no nasal discharge; uvula midline, no oropharyngeal erythema or exudates; dry MM  Respiratory: CTA B/L; no W/R/R, no retractions  Cardiac: +S1/S2; RRR; no M/R/G; PMI non-displaced  Gastrointestinal: abdomen soft, NT/ND; no rebound or guarding; +BSx4  Extremities: WWP, no clubbing or cyanosis; no peripheral edema  Musculoskeletal: NROM x4; no joint swelling, tenderness or erythema  Vascular: 2+ radial, DP/PT pulses B/L  Dermatologic: skin warm, dry and intact; no rashes, wounds, or scars  Neurologic: AAOx3; CNII-XII grossly intact; no focal deficits  Psychiatric: affect and characteristics of appearance, verbalizations, behaviors are appropriate

## 2024-01-24 NOTE — PROGRESS NOTE ADULT - PROBLEM SELECTOR PLAN 3
home meds: metformin 850mg BID, Jardiance 25mg daily, lantus 16 units in AM    Plan:  insuline sliding scale  continue home insulin  expecting increased insulin requirements as patient is on Steroids  continue to monitor fsg  patient needs official med rec in AM. Patient unsure about exact medications

## 2024-01-24 NOTE — PROGRESS NOTE ADULT - SUBJECTIVE AND OBJECTIVE BOX
Pt seen and examined   smiling  much better  no complaints  wants to go home    REVIEW OF SYSTEMS:  Constitutional: No fever, weight loss or fatigue  Cardiovascular: No chest pain, palpitations, dizziness or leg swelling  resp: no sob no cough  Gastrointestinal: No abdominal or epigastric pain. No nausea, vomiting  Skin: No itching, burning, rashes or lesions       MEDICATIONS:  MEDICATIONS  (STANDING):  atorvastatin 40 milliGRAM(s) Oral at bedtime  dexAMETHasone  Injectable 6 milliGRAM(s) IV Push daily  enoxaparin Injectable 40 milliGRAM(s) SubCutaneous every 24 hours  famotidine    Tablet 20 milliGRAM(s) Oral daily  guaiFENesin  milliGRAM(s) Oral every 12 hours  insulin glargine Injectable (LANTUS) 16 Unit(s) SubCutaneous every morning  insulin lispro (ADMELOG) corrective regimen sliding scale   SubCutaneous Before meals and at bedtime  levothyroxine 112 MICROGram(s) Oral daily  pantoprazole    Tablet 40 milliGRAM(s) Oral before breakfast  polyethylene glycol 3350 17 Gram(s) Oral every 12 hours  remdesivir  IVPB 100 milliGRAM(s) IV Intermittent every 24 hours  remdesivir  IVPB   IV Intermittent   senna 2 Tablet(s) Oral at bedtime  traZODone 50 milliGRAM(s) Oral at bedtime    MEDICATIONS  (PRN):  acetaminophen     Tablet .. 650 milliGRAM(s) Oral every 6 hours PRN Temp greater or equal to 38C (100.4F), Mild Pain (1 - 3)  benzonatate 100 milliGRAM(s) Oral every 8 hours PRN Cough      Allergies    Cipro (Unknown)  penicillin (Unknown)  sulfa drugs (Unknown)    Intolerances        Vital Signs Last 24 Hrs  T(C): 36.7 (24 Jan 2024 09:15), Max: 38.2 (23 Jan 2024 16:36)  T(F): 98.1 (24 Jan 2024 09:15), Max: 100.8 (23 Jan 2024 16:36)  HR: 82 (24 Jan 2024 09:15) (75 - 92)  BP: 120/78 (24 Jan 2024 09:15) (115/56 - 126/78)  BP(mean): --  RR: 16 (24 Jan 2024 09:15) (16 - 18)  SpO2: 94% (24 Jan 2024 09:15) (92% - 97%)    Parameters below as of 24 Jan 2024 09:15  Patient On (Oxygen Delivery Method): room air          PHYSICAL EXAM:    General:   in no acute distress  HEENT: MMM, conjunctiva and sclera clear  Lungs: clear  Heart: regular  Gastrointestinal: Soft non-tender non-distended; Normal bowel sounds; obese  Skin: Warm and dry. No obvious rash    LABS:      CBC Full  -  ( 24 Jan 2024 05:30 )  WBC Count : 2.99 K/uL  RBC Count : 4.31 M/uL  Hemoglobin : 12.8 g/dL  Hematocrit : 40.8 %  Platelet Count - Automated : 188 K/uL  Mean Cell Volume : 94.7 fl  Mean Cell Hemoglobin : 29.7 pg  Mean Cell Hemoglobin Concentration : 31.4 gm/dL  Auto Neutrophil # : 1.84 K/uL  Auto Lymphocyte # : 1.04 K/uL  Auto Monocyte # : 0.08 K/uL  Auto Eosinophil # : 0.00 K/uL  Auto Basophil # : 0.01 K/uL  Auto Neutrophil % : 61.5 %  Auto Lymphocyte % : 34.8 %  Auto Monocyte % : 2.7 %  Auto Eosinophil % : 0.0 %  Auto Basophil % : 0.3 %    01-24    141  |  107  |  12  ----------------------------<  203<H>  4.2   |  24  |  0.51    Ca    9.6      24 Jan 2024 05:30  Phos  4.2     01-24  Mg     2.3     01-24    TPro  6.9  /  Alb  3.9  /  TBili  0.5  /  DBili  x   /  AST  30  /  ALT  24  /  AlkPhos  96  01-24    PT/INR - ( 24 Jan 2024 05:30 )   PT: 10.4 sec;   INR: 0.91          PTT - ( 24 Jan 2024 05:30 )  PTT:32.8 sec      Urinalysis Basic - ( 24 Jan 2024 05:30 )    Color: x / Appearance: x / SG: x / pH: x  Gluc: 203 mg/dL / Ketone: x  / Bili: x / Urobili: x   Blood: x / Protein: x / Nitrite: x   Leuk Esterase: x / RBC: x / WBC x   Sq Epi: x / Non Sq Epi: x / Bacteria: x                RADIOLOGY & ADDITIONAL STUDIES (The following images were personally reviewed):
78 years old female with diabetes and hyperlipidemia referred to me for admit due to O2 SAt 90-92 on RA having tested positive for Covid in community 2-3 days ago and getting worse (daughter Nayla [phone])
Subjective: Continues to complain of cough. Pt not SOB able to discuss medical condition hoping to go home. AOx3      Vital Signs Last 24 Hrs  T(C): 36.4 (24 Jan 2024 05:15), Max: 38.2 (23 Jan 2024 16:36)  T(F): 97.6 (24 Jan 2024 05:15), Max: 100.8 (23 Jan 2024 16:36)  HR: 92 (24 Jan 2024 05:15) (75 - 92)  BP: 121/75 (24 Jan 2024 05:15) (115/56 - 126/78)    RR: 18 (24 Jan 2024 05:15) (16 - 18)  SpO2: 97% (24 Jan 2024 05:15) (92% - 97%) RA      LABS:                        12.8   2.99  )-----------( 188      ( 24 Jan 2024 05:30 )             40.8     01-24    141  |  107  |  12  ----------------------------<  203<H>  4.2   |  24  |  0.51    Ca    9.6      24 Jan 2024 05:30  Phos  4.2     01-24  Mg     2.3     01-24    TPro  6.9  /  Alb  3.9  /  TBili  0.5  /  DBili  x   /  AST  30  /  ALT  24  /  AlkPhos  96  01-24    PT/INR - ( 24 Jan 2024 05:30 )   PT: 10.4 sec;   INR: 0.91     PTT - ( 24 Jan 2024 05:30 )  PTT:32.8 sec  Urinalysis Basic - ( 24 Jan 2024 05:30 )    Color: x / Appearance: x / SG: x / pH: x  Gluc: 203 mg/dL / Ketone: x  / Bili: x / Urobili: x   Blood: x / Protein: x / Nitrite: x   Leuk Esterase: x / RBC: x / WBC x   Sq Epi: x / Non Sq Epi: x / Bacteria: x      CAPILLARY BLOOD GLUCOSE      POCT Blood Glucose.: 191 mg/dL (23 Jan 2024 22:21)        Consultant(s) Notes Reviewed:  [x ] YES  [ ] NO    MEDICATIONS  (STANDING):  atorvastatin 40 milliGRAM(s) Oral at bedtime  dexAMETHasone  Injectable 6 milliGRAM(s) IV Push daily  enoxaparin Injectable 40 milliGRAM(s) SubCutaneous every 24 hours  famotidine    Tablet 20 milliGRAM(s) Oral daily  guaiFENesin  milliGRAM(s) Oral every 12 hours  insulin glargine Injectable (LANTUS) 16 Unit(s) SubCutaneous every morning  insulin lispro (ADMELOG) corrective regimen sliding scale   SubCutaneous Before meals and at bedtime  levothyroxine 112 MICROGram(s) Oral daily  pantoprazole    Tablet 40 milliGRAM(s) Oral before breakfast  polyethylene glycol 3350 17 Gram(s) Oral every 12 hours  remdesivir  IVPB   IV Intermittent   remdesivir  IVPB 100 milliGRAM(s) IV Intermittent every 24 hours  senna 2 Tablet(s) Oral at bedtime  traZODone 50 milliGRAM(s) Oral at bedtime    MEDICATIONS  (PRN):  acetaminophen     Tablet .. 650 milliGRAM(s) Oral every 6 hours PRN Temp greater or equal to 38C (100.4F), Mild Pain (1 - 3)  benzonatate 100 milliGRAM(s) Oral every 8 hours PRN Cough      Care Discussed with Consultants/Other Providers [ x] YES  [ ] NO    RADIOLOGY & ADDITIONAL TESTS:

## 2024-01-24 NOTE — DISCHARGE NOTE PROVIDER - CARE PROVIDER_API CALL
Tariq Anne  Gastroenterology  [address], [address] [address]  Phone: [phone]  Fax: [phone]  Established Patient  Follow Up Time: 2 weeks

## 2024-01-24 NOTE — PATIENT PROFILE ADULT - FALL HARM RISK - HARM RISK INTERVENTIONS
Assistance with ambulation/Assistance OOB with selected safe patient handling equipment/Communicate Risk of Fall with Harm to all staff/Monitor for mental status changes/Monitor gait and stability/Reinforce activity limits and safety measures with patient and family/Tailored Fall Risk Interventions/Use of alarms - bed, chair and/or voice tab/Visual Cue: Yellow wristband and red socks/Bed in lowest position, wheels locked, appropriate side rails in place/Call bell, personal items and telephone in reach/Instruct patient to call for assistance before getting out of bed or chair/Non-slip footwear when patient is out of bed/Roxie to call system/Physically safe environment - no spills, clutter or unnecessary equipment/Purposeful Proactive Rounding/Room/bathroom lighting operational, light cord in reach

## 2024-01-24 NOTE — PROGRESS NOTE ADULT - PROBLEM SELECTOR PLAN 5
home meds based on surescripts: Trazadone 50mg daily, DAYVIGO 5mg daily, quviviq 25  Please continue after official med rec in AM

## 2024-01-24 NOTE — DISCHARGE NOTE NURSING/CASE MANAGEMENT/SOCIAL WORK - NSTOBACCONEVERSMOKERY/N_GEN_A
No [Patient preference] : as per patient preference [Continuing, patient not seen in-person within last 12 months (provide details below)] : Telehealth services are continuing, patient not seen in-person within last 12 months.  [Telehealth (audio & video) - Individual/Group] : This visit was provided via telehealth using real-time 2-way audio visual technology. [Other Location: e.g. Home (Enter Location, City,State)___] : The provider was located at [unfilled]. [Home] : The patient, [unfilled], was located at home, [unfilled], at the time of the visit. [FreeTextEntry4] : 2:15 [FreeTextEntry5] : 3:00 [Patient] : Patient [Other: _____] : [unfilled] [FreeTextEntry1] : Pt. is a 70 yo , domiciled female who was referred by Dr. Yovanny Wiseman for management of severe anxiety and difficulty adjusting to various stressors. Pt. has gained significant self-awareness and is better able to restructure maladaptive thoughts that contribute to anxiety. While at times she is easily set off by situational stressors, she can retrospectively identify her own role in generating the stress/anxiety. Pt. open to more consistently using relaxation. Pt. presents today with improvement in mood, anxiety and self care efforts.  [TextBox_17] : Pain Fellow Dr. Wolfgang Nguyen observed session as part of pain training.  Pt. provided verbal consent for Dr. Nguyen to observe session.

## 2024-01-24 NOTE — DISCHARGE NOTE PROVIDER - NSDCCPCAREPLAN_GEN_ALL_CORE_FT
PRINCIPAL DISCHARGE DIAGNOSIS  Diagnosis: 2019 novel coronavirus disease (COVID-19)  Assessment and Plan of Treatment: You were diagnosed with the new COVID-19 coronavirus infection via a nasal swab. The treatment for this infection is supportive care, which includes: rest, maintaining adequate oral intake of food and water, and taking acetaminophen/tylenol for fever. Take tylenol as needed, every 6 hours, with a maximum daily dose of 4,000 mg a day. Please visit your nearest urgent care or emergency department should you start to experience: severe shortness of breath, severe cough/wheezing/difficulty breathing, or fever >103 for 3 days. Please maintain a good healthy diet. If you have any questions, please call your primary care provider. You were given 1 dose of Remdesivir and 1 dose of Dexamethasone, but had no need for further assistance breathing wise as you were comfortable on room air.

## 2024-01-24 NOTE — DISCHARGE NOTE NURSING/CASE MANAGEMENT/SOCIAL WORK - PATIENT PORTAL LINK FT
You can access the FollowMyHealth Patient Portal offered by [facility] by registering at the following website: [email or web address]. By joining [employer]’s FollowMyHealth portal, you will also be able to view your health information using other applications (apps) compatible with our system.

## 2024-01-24 NOTE — DISCHARGE NOTE PROVIDER - NSDCMRMEDTOKEN_GEN_ALL_CORE_FT
acetaminophen 325 mg oral tablet: 2 tab(s) orally every 6 hours As needed Temp greater or equal to 38C (100.4F), Mild Pain (1 - 3)  benzonatate 100 mg oral capsule: 1 cap(s) orally every 8 hours As needed Cough  Dayvigo 5 mg oral tablet: 1 tab(s) orally once a day  famotidine 20 mg oral tablet: 1 tab(s) orally once a day  guaiFENesin 600 mg oral tablet, extended release: 1 tab(s) orally every 12 hours  Jardiance 25 mg oral tablet: 1 tab(s) orally once a day  KlonoPIN 0.25 mg oral tablet: orally once a day as needed for  anxiety  lamoTRIgine (blue dose pack) 25 mg oral tablet: orally once a day  metFORMIN 850 mg oral tablet: 1 tab(s) orally 2 times a day  Quviviq 25 mg oral tablet: 1 tab(s) orally once a day  Rexulti 2 mg oral tablet: 1 tab(s) orally once a day  rosuvastatin 10 mg oral tablet: 1 tab(s) orally once a day  Synthroid 112 mcg (0.112 mg) oral tablet: 1 tab(s) orally once a day  traZODone 50 mg oral tablet: 1 tab(s) orally once a day (at bedtime)

## 2024-01-24 NOTE — PROGRESS NOTE ADULT - PROBLEM SELECTOR PLAN 9
F: none  E: keep K >4 and Mg >2, replete as necessary  N: Carb consistent diet   DVT ppx: Lovenox, SCDs  Dispo: [facility]

## 2024-01-24 NOTE — PROGRESS NOTE ADULT - ASSESSMENT
discharge home
79 y/o female with diverticulitis, natalio, dm, hypothyroidism, gerd presents with sob and cough since last Wednesday admitted for acute hypoxic respiratory failure likely 2/2 COVID.

## 2024-02-06 DIAGNOSIS — Z88.2 ALLERGY STATUS TO SULFONAMIDES: ICD-10-CM

## 2024-02-06 DIAGNOSIS — Z88.1 ALLERGY STATUS TO OTHER ANTIBIOTIC AGENTS STATUS: ICD-10-CM

## 2024-02-06 DIAGNOSIS — E78.5 HYPERLIPIDEMIA, UNSPECIFIED: ICD-10-CM

## 2024-02-06 DIAGNOSIS — F32.9 MAJOR DEPRESSIVE DISORDER, SINGLE EPISODE, UNSPECIFIED: ICD-10-CM

## 2024-02-06 DIAGNOSIS — U07.1 COVID-19: ICD-10-CM

## 2024-02-06 DIAGNOSIS — Z79.4 LONG TERM (CURRENT) USE OF INSULIN: ICD-10-CM

## 2024-02-06 DIAGNOSIS — Z79.890 HORMONE REPLACEMENT THERAPY: ICD-10-CM

## 2024-02-06 DIAGNOSIS — G47.33 OBSTRUCTIVE SLEEP APNEA (ADULT) (PEDIATRIC): ICD-10-CM

## 2024-02-06 DIAGNOSIS — E03.9 HYPOTHYROIDISM, UNSPECIFIED: ICD-10-CM

## 2024-02-06 DIAGNOSIS — K59.00 CONSTIPATION, UNSPECIFIED: ICD-10-CM

## 2024-02-06 DIAGNOSIS — J96.01 ACUTE RESPIRATORY FAILURE WITH HYPOXIA: ICD-10-CM

## 2024-02-06 DIAGNOSIS — E11.9 TYPE 2 DIABETES MELLITUS WITHOUT COMPLICATIONS: ICD-10-CM

## 2024-02-06 DIAGNOSIS — F41.9 ANXIETY DISORDER, UNSPECIFIED: ICD-10-CM

## 2024-02-06 DIAGNOSIS — Z88.0 ALLERGY STATUS TO PENICILLIN: ICD-10-CM

## 2024-02-06 DIAGNOSIS — G47.00 INSOMNIA, UNSPECIFIED: ICD-10-CM

## 2024-02-06 DIAGNOSIS — Z79.84 LONG TERM (CURRENT) USE OF ORAL HYPOGLYCEMIC DRUGS: ICD-10-CM

## 2024-02-06 DIAGNOSIS — K21.9 GASTRO-ESOPHAGEAL REFLUX DISEASE WITHOUT ESOPHAGITIS: ICD-10-CM

## 2025-06-10 NOTE — ED ADULT NURSE NOTE - OTHER COMPLAINTS
Ambulatory Care Coordination Note     6/10/2025 3:13 PM     ACM outreach attempt by this ACM today to perform care management follow up . ACM was unable to reach the patient by telephone today;   left voice message requesting a return phone call to this ACM.     ACM: Linsey Booker RN     Care Summary Note: UTRx1    PCP/Specialist follow up:   Future Appointments         Provider Specialty Dept Phone    7/9/2025 10:15 AM Bear Bryson MD Internal Medicine [phone]    7/24/2025 9:45 AM Blaine Alcaraz MD Cardiology [phone]    8/29/2025 9:00 AM Lorenzo Willson MD Vascular Surgery [phone]    3/11/2026 9:00 AM Nabor Armstrong MD Pulmonology [phone]            Follow Up:   Plan for next ACM outreach in approximately 1 week to complete:  - advance care planning  - goal progression  - education .             
per ems, sent from md for Remdesivir. c.o productive cough.